# Patient Record
Sex: FEMALE | Race: WHITE | NOT HISPANIC OR LATINO
[De-identification: names, ages, dates, MRNs, and addresses within clinical notes are randomized per-mention and may not be internally consistent; named-entity substitution may affect disease eponyms.]

---

## 2020-07-07 ENCOUNTER — APPOINTMENT (OUTPATIENT)
Dept: GASTROENTEROLOGY | Facility: CLINIC | Age: 60
End: 2020-07-07

## 2020-07-14 ENCOUNTER — APPOINTMENT (OUTPATIENT)
Dept: GASTROENTEROLOGY | Facility: CLINIC | Age: 60
End: 2020-07-14
Payer: COMMERCIAL

## 2020-07-14 VITALS
SYSTOLIC BLOOD PRESSURE: 122 MMHG | HEART RATE: 68 BPM | BODY MASS INDEX: 21.26 KG/M2 | WEIGHT: 120 LBS | HEIGHT: 63 IN | DIASTOLIC BLOOD PRESSURE: 82 MMHG

## 2020-07-14 DIAGNOSIS — R10.31 RIGHT LOWER QUADRANT PAIN: ICD-10-CM

## 2020-07-14 DIAGNOSIS — K59.09 OTHER CONSTIPATION: ICD-10-CM

## 2020-07-14 DIAGNOSIS — Z12.11 ENCOUNTER FOR SCREENING FOR MALIGNANT NEOPLASM OF COLON: ICD-10-CM

## 2020-07-14 PROCEDURE — 99205 OFFICE O/P NEW HI 60 MIN: CPT

## 2020-07-14 NOTE — ASSESSMENT
[FreeTextEntry1] : Chronic constipation- start daily MiraLAX\par \par RLQ pain-patient had reported recent CT which did not demonstrate a cause. Will obtain report. Colonoscopy for further evaluation. \par \par Colon cancer screening- patient due for screening. \par Risks (including but not limited to sedation risks, infection, bleeding, perforation, possibility of missed lesions), benefits and alternatives to procedure, including not doing the procedure, were discussed with patient. Patient understood and agreed to proceed with colonoscopy. \par Colonoscopy preparation instructions reviewed with patient.\par 2 Dulcolax two days prior to procedure + Split MiraLAX/Dulcolax preparation starting day prior to procedure\par \par

## 2020-07-14 NOTE — HISTORY OF PRESENT ILLNESS
[FreeTextEntry1] : 60 year F s/p csection, osteoporosis, \par seen at the request of Dr. Kaleigh León for the evaluation of colon cancer screening and abd pain. \par \par \par RLQ pain off/on x years, stable-not progressive, 5/10, lasts few minutes. does not wake her up at night. \par worse when lifting heavy things, no other clear ppt or relieving factors. \par she had gyn eval with US that was unremarkable\par CT Abd/Pelvis with IVC only last week apparently showed nothing. \par \par she has chronic lifelong constipation. \par bm 2-3 days per week. \par \par last colonoscopy 9 years ago with Dr. Ahumada, small polyps, told to repeat in 5 years\par colonoscopy at age 45 no polyps\par \par Patient denies n/v/heartburn, reflux, dysphagia/odynophagia, change in bowel habits, diarrhea,  brbpr, melena. no weight loss.  Good appetite and energy level. denies regular NSAID use. \par \par she is mother of 4\par \par \par fam hx: negative for colon polyps, colon cancer\par \par \par

## 2020-07-14 NOTE — PHYSICAL EXAM
[General Appearance - Alert] : alert [General Appearance - In No Acute Distress] : in no acute distress [Sclera] : the sclera and conjunctiva were normal [Hearing Threshold Finger Rub Not [place]] : hearing was normal [Outer Ear] : the ears and nose were normal in appearance [Neck Appearance] : the appearance of the neck was normal [] : no respiratory distress [Apical Impulse] : the apical impulse was normal [Abdomen Soft] : soft [Abdomen Tenderness] : non-tender [Abnormal Walk] : normal gait [Skin Color & Pigmentation] : normal skin color and pigmentation [Oriented To Time, Place, And Person] : oriented to person, place, and time [Cranial Nerves] : cranial nerves 2-12 were intact [Impaired Insight] : insight and judgment were intact [FreeTextEntry1] : deferred to upcoming colonoscopy

## 2020-09-29 ENCOUNTER — RESULT REVIEW (OUTPATIENT)
Age: 60
End: 2020-09-29

## 2020-10-01 ENCOUNTER — RESULT REVIEW (OUTPATIENT)
Age: 60
End: 2020-10-01

## 2020-10-02 ENCOUNTER — APPOINTMENT (OUTPATIENT)
Dept: GASTROENTEROLOGY | Facility: HOSPITAL | Age: 60
End: 2020-10-02

## 2024-07-16 NOTE — REASON FOR VISIT
Head, normocephalic, atraumatic, Face, Face within normal limits, Ears, External ears within normal limits
[Consultation] : a consultation visit